# Patient Record
Sex: FEMALE | Race: NATIVE HAWAIIAN OR OTHER PACIFIC ISLANDER
[De-identification: names, ages, dates, MRNs, and addresses within clinical notes are randomized per-mention and may not be internally consistent; named-entity substitution may affect disease eponyms.]

---

## 2018-08-06 ENCOUNTER — HOSPITAL ENCOUNTER (EMERGENCY)
Dept: HOSPITAL 76 - ED | Age: 38
LOS: 1 days | Discharge: HOME | End: 2018-08-07
Payer: SELF-PAY

## 2018-08-06 DIAGNOSIS — F17.200: ICD-10-CM

## 2018-08-06 DIAGNOSIS — N39.0: Primary | ICD-10-CM

## 2018-08-06 LAB
GLUCOSE UR QL STRIP.AUTO: NEGATIVE MG/DL
KETONES UR QL STRIP.AUTO: (no result) MG/DL
NITRITE UR QL STRIP.AUTO: POSITIVE
PH UR STRIP.AUTO: 6 PH (ref 5–7.5)
PROT UR STRIP.AUTO-MCNC: 100 MG/DL
RBC # UR STRIP.AUTO: (no result) /UL
SP GR UR STRIP.AUTO: >=1.03 (ref 1–1.03)
UROBILINOGEN UR QL STRIP.AUTO: (no result) E.U./DL
UROBILINOGEN UR STRIP.AUTO-MCNC: NEGATIVE MG/DL

## 2018-08-06 PROCEDURE — 81001 URINALYSIS AUTO W/SCOPE: CPT

## 2018-08-06 PROCEDURE — 87086 URINE CULTURE/COLONY COUNT: CPT

## 2018-08-06 PROCEDURE — 81025 URINE PREGNANCY TEST: CPT

## 2018-08-06 PROCEDURE — 81003 URINALYSIS AUTO W/O SCOPE: CPT

## 2018-08-06 PROCEDURE — 87181 SC STD AGAR DILUTION PER AGT: CPT

## 2018-08-06 PROCEDURE — 99283 EMERGENCY DEPT VISIT LOW MDM: CPT

## 2018-08-07 VITALS — DIASTOLIC BLOOD PRESSURE: 78 MMHG | SYSTOLIC BLOOD PRESSURE: 109 MMHG

## 2018-08-07 LAB
CLARITY UR REFRACT.AUTO: (no result)
HCG UR QL: NEGATIVE
RBC # URNS HPF: (no result) /HPF (ref 0–5)
SQUAMOUS URNS QL MICRO: (no result)

## 2018-08-07 NOTE — ED PHYSICIAN DOCUMENTATION
PD HPI FEMALE 





- Stated complaint


Stated Complaint: FEMALE 





- Chief complaint


Chief Complaint: Abd Pain





- History obtained from


History obtained from: Patient





- History of Present Illness


Timing - onset: How many days ago (3-4)


Timing - duration: Days


Timing - details: Gradual onset


Associated symptoms: Dysuria, Urinary frequency.  No: Fever


Contributing factors: No: Pregnant


Recently seen: Not recently seen





- Additional information


Additional information: 





c/o 3-4 days of burning dysuria, urinary frequency, suprapubic pressure





Review of Systems


Constitutional: denies: Fever


GI: denies: Abdominal Pain, Nausea, Vomiting


: reports: Dysuria, Frequency.  denies: Discharge, Now pregnant EGA


Musculoskeletal: denies: Back pain





PD PAST MEDICAL HISTORY





- Past Medical History


Past Medical History: Yes


Respiratory: Asthma





- Past Surgical History


Past Surgical History: Yes


/GYN: Other





- Present Medications


Home Medications: 


 Ambulatory Orders











 Medication  Instructions  Recorded  Confirmed


 


Cystex 162 mg PO DAILY PRN 08/06/18 


 


Omeprazole 1 tab PO DAILY 08/06/18 08/06/18


 


Nitrofurantoin Monohyd/M-Cryst 100 mg PO BID #10 capsule 08/07/18 





[Macrobid 100 mg Capsule]   


 


Phenazopyridine [Pyridium] 200 mg PO TID 3 Days  tablet 08/07/18 














- Allergies


Allergies/Adverse Reactions: 


 Allergies











Allergy/AdvReac Type Severity Reaction Status Date / Time


 


Sulfa (Sulfonamide Allergy  Respiratory Verified 08/06/18 23:37





Antibiotics)     














- Social History


Does the pt smoke?: Yes


Smoking Status: Current every day smoker


Does the pt drink ETOH?: Yes


Does the pt have substance abuse?: No





- Immunizations


Immunizations are current?: Yes





- POLST


Patient has POLST: No





PD ED PE NORMAL





- Vitals


Vital signs reviewed: Yes





- General


General: Alert and oriented X 3, No acute distress, Well developed/nourished





- Abdomen


Abdomen: Soft, Non tender





- Back


Back: No CVA TTP





Results





- Vitals


Vitals: 


 Vital Signs - 24 hr











  08/06/18 08/07/18





  23:31 01:14


 


Temperature 36.0 C L 36.2 C L


 


Heart Rate 95 80


 


Respiratory 16 16





Rate  


 


Blood Pressure 125/76 109/78


 


O2 Saturation 99 99








 Oxygen











O2 Source                      Room air

















- Labs


Labs: 


 Microbiology











 08/06/18 23:35 Urine Culture - Preliminary





 Urine,Clean Catch    CULTURE IN PROGRESS. RESULTS TO FOLLOW.








 Laboratory Tests











  08/06/18





  23:35


 


Urine Color  YELLOW


 


Urine Clarity  CLOUDY


 


Urine pH  6.0


 


Ur Specific Gravity  >=1.030 H


 


Urine Protein  100 H


 


Urine Glucose (UA)  NEGATIVE


 


Urine Ketones  TRACE


 


Urine Occult Blood  LARGE H


 


Urine Nitrite  POSITIVE H


 


Urine Bilirubin  NEGATIVE


 


Urine Urobilinogen  0.2 (NORMAL)


 


Ur Leukocyte Esterase  MODERATE H


 


Urine RBC  0-5


 


Urine WBC  >25 H


 


Ur Squamous Epith Cells  RARE Squamous


 


Urine Bacteria  None Seen


 


Ur Microscopic Review  INDICATED


 


Urine Culture Comments  INDICATED


 


Urine HCG, Qual  NEGATIVE














PD MEDICAL DECISION MAKING





- ED course


Complexity details: reviewed results, considered differential, d/w patient





- Sepsis Event


Vital Signs: 


 Vital Signs - 24 hr











  08/06/18 08/07/18





  23:31 01:14


 


Temperature 36.0 C L 36.2 C L


 


Heart Rate 95 80


 


Respiratory 16 16





Rate  


 


Blood Pressure 125/76 109/78


 


O2 Saturation 99 99








 Oxygen











O2 Source                      Room air

















Departure





- Departure


Disposition: 01 Home, Self Care


Clinical Impression: 


 Urinary tract infection





Condition: Good


Instructions:  ED UTI Cystitis Female


Prescriptions: 


Nitrofurantoin Monohyd/M-Cryst [Macrobid 100 mg Capsule] 100 mg PO BID #10 

capsule


Phenazopyridine [Pyridium] 200 mg PO TID 3 Days  tablet


Discharge Date/Time: 08/07/18 01:16